# Patient Record
Sex: FEMALE | Race: WHITE
[De-identification: names, ages, dates, MRNs, and addresses within clinical notes are randomized per-mention and may not be internally consistent; named-entity substitution may affect disease eponyms.]

---

## 2020-02-02 ENCOUNTER — HOSPITAL ENCOUNTER (INPATIENT)
Dept: HOSPITAL 19 - LDRO | Age: 25
LOS: 2 days | Discharge: HOME | End: 2020-02-04
Admitting: OBSTETRICS & GYNECOLOGY
Payer: COMMERCIAL

## 2020-02-02 VITALS — DIASTOLIC BLOOD PRESSURE: 73 MMHG | HEART RATE: 78 BPM | TEMPERATURE: 98.1 F | SYSTOLIC BLOOD PRESSURE: 118 MMHG

## 2020-02-02 VITALS — DIASTOLIC BLOOD PRESSURE: 74 MMHG | SYSTOLIC BLOOD PRESSURE: 128 MMHG | HEART RATE: 89 BPM

## 2020-02-02 VITALS — TEMPERATURE: 98.2 F | SYSTOLIC BLOOD PRESSURE: 118 MMHG | HEART RATE: 84 BPM | DIASTOLIC BLOOD PRESSURE: 60 MMHG

## 2020-02-02 VITALS — DIASTOLIC BLOOD PRESSURE: 76 MMHG | SYSTOLIC BLOOD PRESSURE: 123 MMHG | HEART RATE: 85 BPM

## 2020-02-02 VITALS — DIASTOLIC BLOOD PRESSURE: 83 MMHG | SYSTOLIC BLOOD PRESSURE: 127 MMHG | HEART RATE: 86 BPM | TEMPERATURE: 97.5 F

## 2020-02-02 VITALS — DIASTOLIC BLOOD PRESSURE: 74 MMHG | SYSTOLIC BLOOD PRESSURE: 131 MMHG | HEART RATE: 85 BPM

## 2020-02-02 VITALS — SYSTOLIC BLOOD PRESSURE: 128 MMHG | DIASTOLIC BLOOD PRESSURE: 73 MMHG | HEART RATE: 87 BPM

## 2020-02-02 VITALS — SYSTOLIC BLOOD PRESSURE: 120 MMHG | DIASTOLIC BLOOD PRESSURE: 65 MMHG | HEART RATE: 84 BPM | TEMPERATURE: 98 F

## 2020-02-02 VITALS — DIASTOLIC BLOOD PRESSURE: 64 MMHG | HEART RATE: 81 BPM | SYSTOLIC BLOOD PRESSURE: 112 MMHG

## 2020-02-02 VITALS — SYSTOLIC BLOOD PRESSURE: 118 MMHG | DIASTOLIC BLOOD PRESSURE: 73 MMHG | HEART RATE: 78 BPM | TEMPERATURE: 98.1 F

## 2020-02-02 VITALS — HEART RATE: 86 BPM | DIASTOLIC BLOOD PRESSURE: 73 MMHG | TEMPERATURE: 98.3 F | SYSTOLIC BLOOD PRESSURE: 140 MMHG

## 2020-02-02 VITALS — HEART RATE: 93 BPM | SYSTOLIC BLOOD PRESSURE: 122 MMHG | DIASTOLIC BLOOD PRESSURE: 76 MMHG

## 2020-02-02 VITALS — DIASTOLIC BLOOD PRESSURE: 57 MMHG | SYSTOLIC BLOOD PRESSURE: 115 MMHG | HEART RATE: 90 BPM

## 2020-02-02 VITALS — SYSTOLIC BLOOD PRESSURE: 127 MMHG | DIASTOLIC BLOOD PRESSURE: 78 MMHG | HEART RATE: 89 BPM

## 2020-02-02 VITALS — DIASTOLIC BLOOD PRESSURE: 70 MMHG | TEMPERATURE: 98 F | HEART RATE: 83 BPM | SYSTOLIC BLOOD PRESSURE: 105 MMHG

## 2020-02-02 VITALS — SYSTOLIC BLOOD PRESSURE: 131 MMHG | HEART RATE: 101 BPM | DIASTOLIC BLOOD PRESSURE: 80 MMHG

## 2020-02-02 VITALS — DIASTOLIC BLOOD PRESSURE: 62 MMHG | SYSTOLIC BLOOD PRESSURE: 125 MMHG | HEART RATE: 87 BPM

## 2020-02-02 VITALS — SYSTOLIC BLOOD PRESSURE: 120 MMHG | HEART RATE: 83 BPM | DIASTOLIC BLOOD PRESSURE: 65 MMHG | TEMPERATURE: 97.7 F

## 2020-02-02 VITALS — DIASTOLIC BLOOD PRESSURE: 66 MMHG | HEART RATE: 79 BPM | SYSTOLIC BLOOD PRESSURE: 116 MMHG

## 2020-02-02 VITALS — WEIGHT: 181.44 LBS | BODY MASS INDEX: 28.48 KG/M2 | HEIGHT: 67.01 IN

## 2020-02-02 VITALS — HEART RATE: 98 BPM | SYSTOLIC BLOOD PRESSURE: 121 MMHG | DIASTOLIC BLOOD PRESSURE: 70 MMHG

## 2020-02-02 VITALS — DIASTOLIC BLOOD PRESSURE: 56 MMHG | HEART RATE: 81 BPM | SYSTOLIC BLOOD PRESSURE: 103 MMHG

## 2020-02-02 VITALS — SYSTOLIC BLOOD PRESSURE: 111 MMHG | DIASTOLIC BLOOD PRESSURE: 87 MMHG | HEART RATE: 110 BPM

## 2020-02-02 VITALS — SYSTOLIC BLOOD PRESSURE: 98 MMHG | DIASTOLIC BLOOD PRESSURE: 57 MMHG | TEMPERATURE: 98.5 F | HEART RATE: 93 BPM

## 2020-02-02 VITALS — DIASTOLIC BLOOD PRESSURE: 60 MMHG | SYSTOLIC BLOOD PRESSURE: 110 MMHG | HEART RATE: 83 BPM

## 2020-02-02 VITALS — SYSTOLIC BLOOD PRESSURE: 119 MMHG | DIASTOLIC BLOOD PRESSURE: 65 MMHG | TEMPERATURE: 97.9 F | HEART RATE: 81 BPM

## 2020-02-02 VITALS — DIASTOLIC BLOOD PRESSURE: 61 MMHG | HEART RATE: 83 BPM | SYSTOLIC BLOOD PRESSURE: 124 MMHG

## 2020-02-02 VITALS — HEART RATE: 85 BPM | DIASTOLIC BLOOD PRESSURE: 62 MMHG | SYSTOLIC BLOOD PRESSURE: 99 MMHG

## 2020-02-02 DIAGNOSIS — Z3A.38: ICD-10-CM

## 2020-02-02 DIAGNOSIS — Z23: ICD-10-CM

## 2020-02-02 LAB
BASOPHILS # BLD: 0 10*3/UL (ref 0–0.2)
BASOPHILS NFR BLD AUTO: 0.2 % (ref 0–2)
EOSINOPHIL # BLD: 0 10*3/UL (ref 0–0.7)
EOSINOPHIL NFR BLD: 0 % (ref 0–4)
ERYTHROCYTE [DISTWIDTH] IN BLOOD BY AUTOMATED COUNT: 13.2 % (ref 11.5–14.5)
GRANULOCYTES # BLD AUTO: 89.7 % (ref 42.2–75.2)
HCT VFR BLD AUTO: 35 % (ref 37–47)
HGB BLD-MCNC: 11.3 G/DL (ref 12.5–16)
LYMPHOCYTES # BLD: 0.7 10*3/UL (ref 1.2–3.4)
LYMPHOCYTES NFR BLD: 4.1 % (ref 20–51)
MCH RBC QN AUTO: 29 PG (ref 27–31)
MCHC RBC AUTO-ENTMCNC: 32 G/DL (ref 33–37)
MCV RBC AUTO: 89 FL (ref 80–100)
MONOCYTES # BLD: 0.9 10*3/UL (ref 0.1–0.6)
MONOCYTES NFR BLD AUTO: 5.4 % (ref 1.7–9.3)
NEUTROPHILS # BLD: 15.6 10*3/UL (ref 1.4–6.5)
PLATELET # BLD AUTO: 187 K/MM3 (ref 130–400)
PMV BLD AUTO: 11.3 FL (ref 7.4–10.4)
RBC # BLD AUTO: 3.93 M/MM3 (ref 4.1–5.3)

## 2020-02-02 PROCEDURE — 0HQ9XZZ REPAIR PERINEUM SKIN, EXTERNAL APPROACH: ICD-10-PCS | Performed by: OBSTETRICS & GYNECOLOGY

## 2020-02-02 PROCEDURE — 0UQMXZZ REPAIR VULVA, EXTERNAL APPROACH: ICD-10-PCS | Performed by: OBSTETRICS & GYNECOLOGY

## 2020-02-02 NOTE — NUR
1121-Patient back on EFM in rocking chair.
1133-Patient leaning forward, unable to maintain continuous tracing in FHR.
1140-Patient readjusting in chair for comfort, unable to maintain continuous
tracing in FHR.
1200-Patient moving from chair to bed break in FHR tracing
1210-SVE 5/80/-2, Patient to BB following SVE. Updated on plan of care.
1220-Notified Dr. Grover of SVE, see MD notification. Orders recieved to admit
patient.

## 2020-02-02 NOTE — NUR
- Bedside report from MONICA Velasquez. Patient currently off EFM in room.
- EFM and TOCO on and tracing. Plan of care discussed. Will wait for
 for SVE.
-  at bedside. Plan of care discussed and patient wishes to
proceed with AROM. SVE /-1 by MD.
- AROM. Large amount of clear, odorless fluid.
- N/V.
- RN called to bedside. Patient states she feels like she needs to push.
SVE 8-9/100/0. Patient repositioned LL.
- SVE 9/100/0. Patient up on birthing ball.
- SVE 9-10/100/0. Patient repositioned LL with stirrup. Intermittent late
decels noted on FHR strip. Moderate variability noted.
- SVE AT/+1. Patient is upset and really wants to push.
- Large variable decel noted with FHR into the 60's. FHR recovered to
baseline within 60 seconds.
- SVE AT/+1. Patient repositioned to [facility].
- SVE Complete/+1. Patient is breathing through contractions at this time.
MD notified.
- FHR deceleration into 90's. O2 applied.  at bedside. FHR
recovered to baseline within 120 seconds. Patient and labor room prepped for
delivery.
- Patient begins pushing with contractions with MD. FHR decels into the
90's recurrently. Scalp stimulation performed by MD.
-  of viable baby girl. Cord clamped by MD and cut by FOB. Cord blood
obtained. NB care assumed by MONICA Mckeon.
- Spontaneous delivery of placenta. Pitocin infusing at 333 ml/hr per
protocol. Fundus massaged to firm. Periurethral and 1st degree laceration
repaired by MD. Local anesthetic used. Pericare provided. Ice pack applied.
 
- PP Recovery.

## 2020-02-02 NOTE — NUR
1005-Dr. Grover on unit. Reviews FHR monitor. Palomo calls out with request
for RN. RN to room. Patient reports feeling a little nauseated and
contractions very painful while ambulating.
1010-SVE by RN, no change from previous check. Updated MD on unit. Orders to
continue to monitor for 2 hours and recheck patient. MD gives order for
intermittent monitoring with reasuring FHT. Updated patient on plan of care.
1017-Patient off EFM to ambulate in room.

## 2020-02-02 NOTE — NUR
1227-Patient off EFM at this time. Reviewed and signed consents.
1245-IV to right hand, blood collected and sent to lab per orders.
1317-Patient to BB
1330-Patient rocking on BB, unable to maintain continuous tracing in FHR. RN
adjusts EFM.
1351-Patient stands at bedside, unintended power of EFM turned off and
immediately back on. Patient to bed for SVE
1404-SVE 6/90/-1. Patient off EFM.
1430-Dr. Grover updated, see MD notification.
1455-Patient back on EFM Patient continues to remain in control during
contractions and breathing through them. Requests warm pack for lower back
discomfort. K-Pad set up.
1505-Patient to bed LL.

## 2020-02-02 NOTE — NUR
1602-SVE 7/100/-1 moderate amount of bloody show. Penny care provided. Up to BB
1612-Dr. Grover updated, see physician notification.
1625-Difficulty maintaining continuous tracing on BB, RN adjusting EFM at
bedside.
1630-Patient to bed to try bar on bed for comfort, RN continues to adjust EFM.
1633-Patient to rocking chair.
1650-100 ml Clear emesis
1653-Patient off EFM to bathroom. and back to rocking chair at 1657
1715-Dr. Grover on unit. Reviews FHR monitor. Ok to proceed with intermittent
monitoring per MD.
1718-Patient off EFM at this time.
1800-Reported of to Wayne,RN

## 2020-02-02 NOTE — NUR
0835-G1 38.6 week patient of Dr. Goodpasture ambulatory to LR4 with complaint
of contractions over the last 90 min every five min and strong. Reports good
FM denies LOF or VB. Denies complications of pregnancy. Placed on EFM, VSS,
see flow record.
0841-SVE by MONICA Garcia 3/80/-2, CHRISTIE Repostioned Wl. Updated on plan of
care. Assessment complete. Radha ANDERSON updated Dr. Grover, see MD
notification.
0941-SVE by MONICA Garcia 3-4/80/-2, CHRISTIE. Dr. Grover updated on SVE and gave
orders to have patient ambulate and recheck in 1 hour.
0945-Patient off EFM to ambulate in halls.

## 2020-02-03 VITALS — SYSTOLIC BLOOD PRESSURE: 116 MMHG | HEART RATE: 92 BPM | TEMPERATURE: 97.6 F | DIASTOLIC BLOOD PRESSURE: 71 MMHG

## 2020-02-03 VITALS — DIASTOLIC BLOOD PRESSURE: 59 MMHG | TEMPERATURE: 98.5 F | HEART RATE: 80 BPM | SYSTOLIC BLOOD PRESSURE: 98 MMHG

## 2020-02-03 VITALS — SYSTOLIC BLOOD PRESSURE: 106 MMHG | TEMPERATURE: 97.5 F | DIASTOLIC BLOOD PRESSURE: 53 MMHG | HEART RATE: 79 BPM

## 2020-02-03 VITALS — HEART RATE: 74 BPM | DIASTOLIC BLOOD PRESSURE: 65 MMHG | SYSTOLIC BLOOD PRESSURE: 96 MMHG | TEMPERATURE: 97.8 F

## 2020-02-03 VITALS — TEMPERATURE: 98.2 F | SYSTOLIC BLOOD PRESSURE: 91 MMHG | DIASTOLIC BLOOD PRESSURE: 54 MMHG | HEART RATE: 80 BPM

## 2020-02-03 VITALS — TEMPERATURE: 98 F | SYSTOLIC BLOOD PRESSURE: 108 MMHG | HEART RATE: 81 BPM | DIASTOLIC BLOOD PRESSURE: 60 MMHG

## 2020-02-03 VITALS — SYSTOLIC BLOOD PRESSURE: 111 MMHG | TEMPERATURE: 97.7 F | HEART RATE: 103 BPM | DIASTOLIC BLOOD PRESSURE: 59 MMHG

## 2020-02-03 NOTE — NUR
Initial visit; Parents thanked  for offering congratulations and God's
blessings for the birth of their daughter.  thanked family for
choosing [facility].

## 2020-02-04 VITALS — DIASTOLIC BLOOD PRESSURE: 72 MMHG | TEMPERATURE: 97.4 F | HEART RATE: 65 BPM | SYSTOLIC BLOOD PRESSURE: 95 MMHG

## 2021-07-28 ENCOUNTER — HOSPITAL ENCOUNTER (OUTPATIENT)
Dept: HOSPITAL 19 - LDRO | Age: 26
Setting detail: OBSERVATION
Discharge: HOME | End: 2021-07-28
Payer: COMMERCIAL

## 2021-07-28 VITALS — HEIGHT: 67.01 IN | BODY MASS INDEX: 27.85 KG/M2 | WEIGHT: 177.47 LBS

## 2021-07-28 VITALS — SYSTOLIC BLOOD PRESSURE: 111 MMHG | TEMPERATURE: 98.4 F | DIASTOLIC BLOOD PRESSURE: 57 MMHG | HEART RATE: 83 BPM

## 2021-07-28 DIAGNOSIS — Z34.90: Primary | ICD-10-CM

## 2021-07-28 NOTE — NUR
FHR CONTINUES  BPM WITH ACCELERATIONS INTO -180'S. BABY IS ACTIVE
WITH LOTS OF VISIBLE AND AUDIBLE FETAL MOVEMENT. DR. GOODPASTURE ON UNIT AND
REVIEWS FHR TRACING. REACTIVE NST. ORDERS TO DISCHARGE PATIENT HOME WITH
INSTRUCTIONS RECEIVED.
 
DISCHARGE INSTRUCTIONS REVIEWED WITH PATIENT AND SPOUSE.

## 2021-07-28 NOTE — NUR
G2L1 AT 32.0 WEEKS GESTATION TO L&D WITH C/O LEAKING OF FLUID. PATIENT STATES
THAT SHE WAS SITTING OUTSIDE AND WHEN SHE STOOD UP SHE FELT A SMALL GUSH OF
FLUID, SHE STATES THAT FELT "WET" BUT HAS NOT HAD ANY LEAKING OF FLUID. SHE
REPORTS GOOD FETAL MOVEMENT AND DENIES ANY VAGINAL BLEEDING.
PATIENT CHANGED INTO GOWN, WEDGED TO LEFT SIDE IN BED. EFMS EXPLAINED AND
APPLIED.  BPM AND REACTIVE WITH PROLONGED ACCELERATIONS INTO THE
170-180'S, NO CTX PER TOCO, VSS. SVE CLOSED WITH NEGATIVE AMNIOTRACE. NO FLUID
NOTED WITH EXAM.
PLAN OF CARE REVIEWED WITH PATIENT AND SPOUSE. DR. GOODPASTURE ON UNIT AND
AWARE OF PATIENT'S ARRIVAL TO UNIT AND REVIEWS FHR TRACING.

## 2021-09-23 ENCOUNTER — HOSPITAL ENCOUNTER (INPATIENT)
Dept: HOSPITAL 19 - LDRO | Age: 26
LOS: 2 days | Discharge: HOME | End: 2021-09-25
Payer: COMMERCIAL

## 2021-09-23 ENCOUNTER — HOSPITAL ENCOUNTER (OUTPATIENT)
Dept: HOSPITAL 19 - COL.LAB | Age: 26
End: 2021-09-23
Payer: COMMERCIAL

## 2021-09-23 VITALS — HEART RATE: 82 BPM | DIASTOLIC BLOOD PRESSURE: 575 MMHG | SYSTOLIC BLOOD PRESSURE: 110 MMHG

## 2021-09-23 VITALS — SYSTOLIC BLOOD PRESSURE: 107 MMHG | DIASTOLIC BLOOD PRESSURE: 56 MMHG | HEART RATE: 81 BPM

## 2021-09-23 VITALS — SYSTOLIC BLOOD PRESSURE: 123 MMHG | DIASTOLIC BLOOD PRESSURE: 58 MMHG | HEART RATE: 97 BPM | TEMPERATURE: 98.7 F

## 2021-09-23 VITALS — SYSTOLIC BLOOD PRESSURE: 115 MMHG | DIASTOLIC BLOOD PRESSURE: 71 MMHG | HEART RATE: 80 BPM

## 2021-09-23 VITALS — WEIGHT: 181.44 LBS | BODY MASS INDEX: 28.48 KG/M2 | HEIGHT: 67.01 IN

## 2021-09-23 VITALS — DIASTOLIC BLOOD PRESSURE: 77 MMHG | SYSTOLIC BLOOD PRESSURE: 127 MMHG | HEART RATE: 80 BPM

## 2021-09-23 VITALS — DIASTOLIC BLOOD PRESSURE: 73 MMHG | SYSTOLIC BLOOD PRESSURE: 122 MMHG | HEART RATE: 82 BPM | TEMPERATURE: 97.7 F

## 2021-09-23 VITALS — DIASTOLIC BLOOD PRESSURE: 60 MMHG | HEART RATE: 83 BPM | SYSTOLIC BLOOD PRESSURE: 124 MMHG

## 2021-09-23 VITALS — DIASTOLIC BLOOD PRESSURE: 73 MMHG | HEART RATE: 70 BPM | SYSTOLIC BLOOD PRESSURE: 126 MMHG | TEMPERATURE: 97.9 F

## 2021-09-23 VITALS — HEART RATE: 87 BPM | SYSTOLIC BLOOD PRESSURE: 120 MMHG | DIASTOLIC BLOOD PRESSURE: 75 MMHG

## 2021-09-23 VITALS — HEART RATE: 92 BPM | SYSTOLIC BLOOD PRESSURE: 121 MMHG | DIASTOLIC BLOOD PRESSURE: 72 MMHG

## 2021-09-23 VITALS — DIASTOLIC BLOOD PRESSURE: 75 MMHG | SYSTOLIC BLOOD PRESSURE: 138 MMHG | HEART RATE: 95 BPM

## 2021-09-23 VITALS — DIASTOLIC BLOOD PRESSURE: 57 MMHG | SYSTOLIC BLOOD PRESSURE: 121 MMHG | HEART RATE: 93 BPM

## 2021-09-23 VITALS — HEART RATE: 81 BPM | DIASTOLIC BLOOD PRESSURE: 70 MMHG | TEMPERATURE: 97.8 F | SYSTOLIC BLOOD PRESSURE: 112 MMHG

## 2021-09-23 VITALS — HEART RATE: 90 BPM | SYSTOLIC BLOOD PRESSURE: 121 MMHG | DIASTOLIC BLOOD PRESSURE: 60 MMHG

## 2021-09-23 DIAGNOSIS — Z3A.40: ICD-10-CM

## 2021-09-23 DIAGNOSIS — O48.0: Primary | ICD-10-CM

## 2021-09-23 DIAGNOSIS — Z20.822: Primary | ICD-10-CM

## 2021-09-23 LAB
BASOPHILS # BLD: 0 10*3/UL (ref 0–0.2)
BASOPHILS NFR BLD AUTO: 0.2 % (ref 0–2)
EOSINOPHIL # BLD: 0.1 10*3/UL (ref 0–0.7)
EOSINOPHIL NFR BLD: 0.5 % (ref 0–4)
ERYTHROCYTE [DISTWIDTH] IN BLOOD BY AUTOMATED COUNT: 13.1 % (ref 11.5–14.5)
GRANULOCYTES # BLD AUTO: 80.8 % (ref 42.2–75.2)
HCT VFR BLD AUTO: 36.7 % (ref 37–47)
HGB BLD-MCNC: 12.4 G/DL (ref 12.5–16)
LYMPHOCYTES # BLD: 1.3 10*3/UL (ref 1.2–3.4)
LYMPHOCYTES NFR BLD: 10.7 % (ref 20–51)
MCH RBC QN AUTO: 31 PG (ref 27–31)
MCHC RBC AUTO-ENTMCNC: 34 G/DL (ref 33–37)
MCV RBC AUTO: 91 FL (ref 80–100)
MONOCYTES # BLD: 0.9 10*3/UL (ref 0.1–0.6)
MONOCYTES NFR BLD AUTO: 7.2 % (ref 1.7–9.3)
NEUTROPHILS # BLD: 10 10*3/UL (ref 1.4–6.5)
PLATELET # BLD AUTO: 193 K/MM3 (ref 130–400)
PMV BLD AUTO: 11.2 FL (ref 7.4–10.4)
RBC # BLD AUTO: 4.02 M/MM3 (ref 4.1–5.3)

## 2021-09-23 PROCEDURE — 10907ZC DRAINAGE OF AMNIOTIC FLUID, THERAPEUTIC FROM PRODUCTS OF CONCEPTION, VIA NATURAL OR ARTIFICIAL OPENING: ICD-10-PCS

## 2021-09-23 NOTE — NUR
1630-G2L1 40.1 Week patient of Dr. Goodpastures  Ambulatory to LR 4 breathing
through contractions. VSS, SVE 3/80/-2 intact. Dr. Goodpasture notified. Will
recheck in one hour, see MD notification. Assessment complete. Up to bedside.
1700-Patient off EFM to ambulate.
1719-Patient back on EFM Category I FHR contractions continue every 2 min and
palpate firm.
1730-SVE 4/80/-2, intact. Dr. Goodpasture updated. Orders to admit patient,
proceeded with plan of care.

## 2021-09-23 NOTE — NUR
1910- THIS RN TO BEDSIDE AFTER PATIENT VOIDED. SVE 5/80/-2. PATIENT WANTS TO
GET IN HOT SHOWER. IV TAPED UP AND MONITORS TAKEN OFF.  WITH
ACCELERATIONS. MARIA LUISA 1-3 MINUTES.

## 2021-09-23 NOTE — NUR
- PATIENT CALLED OUT STATING SHE NEEDED HER NURSE. THIS RN WAS CURRENTLY
UPDATED PROVIDER ABOUT PATIENT ASKING FOR EPDIRAL AND THAT I COULD CALL HER
BACK DUE TO PATIENT CALLING OUT.
- THIS RN TO BEDSIDE. SVE COMPLETE AND +1. THIS RN STAYED IN ROOM WITH
PATIENT DURING THIS TIME HELPING HER BREATHE AND STAY IN CONTROL DURING
CONTRACTIONS. PEACE, RN TO BEDSIDE TO ASSIST DURING THIS TIME. THIS RN ASKED
HER TO CALL THE PROVIDER BACK NOW AND TELL HER WE NEED HER NOW. SHE CALLED
HER.
- THIS RN CALLED NURSERY TO LET THEM KNOW WE ARE READY FOR DELIVERY AND
PROVIDER IS ON HER WAY. THEY VERBALIZED UNDERSTANDING. THIS RN REMAINS AT
BEDSIDE WITH PATIENT DURING THIS TIME WAITING FOR PROVIDER.
- PROVIDER AT BEDSIDE. BED BROKE DOWN FOR DELIVERY AND EVERYTHING SET UP
TO GO FOR DELIVERY.
-  OF VIABLE FEMALE INFANT. DELIVERY OF HEAD AT THIS POINT. LESS THAN
30 SECOND SHOULDER DISTOCIA HAPPENED AT THIS TIME. GIO WAS PERFORMED BY
THIS RN AND THE NURSERY NURSE AND THEN SUPRAPUBIC PRESSURE WAS THEN APPLIED BY
NURSERY NURSE WHILE THE PROVIDER DELIVERED POSTERIOR ARM. AFTER THIS THE REST
OF THE INFANT WAS DELIVERED WITH EASE. INFANT WAS PLACED TO MOTHER ABDOMEN
WHERE NURSERY NURSE ASSUMES CARE AT THIS TIME. CORD WAS CLAMPED BY PROVIDER
AND CUT BY FATHER OF THE BABY.
-  OF PLACENTA. PITOCIN STARTED PER PROTOCOL AT 333ML/HR. FUNDUS
MASSAGED TO FIRM BY THIS RN WITH SMALL AMOUNT OF LOCHIA NOTED AT THIS TIME.
PROVIDER NOTED THAT PATIENT WAS INTACT. PROVIDER EDNA CORD GASSES WHILE THIS
RN CLEANED PATIENT UP AND PUT THE BED BACK TOGETHER. NEW CHUX AND PERIPAD
UNDER PATIENT.VITALS STABLE, FUNDUS FIRM.
- RECOVERY STARTED.

## 2021-09-23 NOTE — NUR
1950- THIS RN AND DR. GOODPASTURE TO BEDSIDE FOR SVE AND AROM. PATIENT WAS IN
THE SHOWER AND VOMITED BUT HAD JUST GOTTEN OUT. EFM AND TOCO BACK ON AND
TRACING.
1952- SVE PER PROVIDER 6/90/-1. AROM WITH CLEAR FLUID NOTED. PATIENT TOLERATED
PROCEDURE. CALL LIGHT WITHIN REACH.

## 2021-09-24 VITALS — HEART RATE: 70 BPM | TEMPERATURE: 98 F | DIASTOLIC BLOOD PRESSURE: 61 MMHG | SYSTOLIC BLOOD PRESSURE: 107 MMHG

## 2021-09-24 VITALS — DIASTOLIC BLOOD PRESSURE: 59 MMHG | SYSTOLIC BLOOD PRESSURE: 104 MMHG | HEART RATE: 81 BPM | TEMPERATURE: 98.7 F

## 2021-09-24 VITALS — TEMPERATURE: 97.8 F | SYSTOLIC BLOOD PRESSURE: 108 MMHG | HEART RATE: 73 BPM | DIASTOLIC BLOOD PRESSURE: 59 MMHG

## 2021-09-24 VITALS — HEART RATE: 73 BPM | DIASTOLIC BLOOD PRESSURE: 71 MMHG | SYSTOLIC BLOOD PRESSURE: 104 MMHG | TEMPERATURE: 97.8 F

## 2021-09-24 VITALS — HEART RATE: 75 BPM | DIASTOLIC BLOOD PRESSURE: 74 MMHG | SYSTOLIC BLOOD PRESSURE: 98 MMHG | TEMPERATURE: 98.2 F

## 2021-09-24 VITALS — HEART RATE: 73 BPM | TEMPERATURE: 98.2 F | SYSTOLIC BLOOD PRESSURE: 108 MMHG | DIASTOLIC BLOOD PRESSURE: 55 MMHG

## 2021-09-24 NOTE — NUR
received a message from TIFFANY Bledsoe that she would be
recommending outpatient physical therapy for patient. SW contacted patient by
phone to discuss recommendation. Patient would like to continue seeing how she
does before scheduling an appointment. ANAND contacted Kacy ANDERSON about having
attending physician add outpatient PT orders upon discharge so patient can
follow up with outpatient PT if she chooses to do so.

## 2021-09-24 NOTE — NUR
Initial visit attempt;  left card offering congratulations and God's
blessings for the birth of their daughter and information regarding the
availability of spiritual care at [facility].

## 2021-09-25 VITALS — DIASTOLIC BLOOD PRESSURE: 83 MMHG | SYSTOLIC BLOOD PRESSURE: 107 MMHG | TEMPERATURE: 97.8 F | HEART RATE: 75 BPM

## 2021-09-25 NOTE — NUR
PATIENT INQUIRED ABOUT A PT CONSULT DUE TO GROIN PAIN AFTER BIRTH. SHE STATES
SHE FEELS MUCH BETTER NOW AND DOES NOT WISH TO SEE PT IN HOSPITAL OR
OUTPATIENT